# Patient Record
Sex: FEMALE | Race: WHITE | URBAN - METROPOLITAN AREA
[De-identification: names, ages, dates, MRNs, and addresses within clinical notes are randomized per-mention and may not be internally consistent; named-entity substitution may affect disease eponyms.]

---

## 2018-04-22 ENCOUNTER — OFFICE VISIT (OUTPATIENT)
Dept: URGENT CARE | Facility: URGENT CARE | Age: 23
End: 2018-04-22
Payer: COMMERCIAL

## 2018-04-22 VITALS
SYSTOLIC BLOOD PRESSURE: 108 MMHG | HEART RATE: 71 BPM | WEIGHT: 140.31 LBS | TEMPERATURE: 97.7 F | DIASTOLIC BLOOD PRESSURE: 61 MMHG

## 2018-04-22 DIAGNOSIS — R05.9 COUGH: ICD-10-CM

## 2018-04-22 DIAGNOSIS — R07.0 THROAT PAIN: Primary | ICD-10-CM

## 2018-04-22 DIAGNOSIS — R09.81 NASAL CONGESTION: ICD-10-CM

## 2018-04-22 LAB
DEPRECATED S PYO AG THROAT QL EIA: NORMAL
SPECIMEN SOURCE: NORMAL

## 2018-04-22 PROCEDURE — 87081 CULTURE SCREEN ONLY: CPT | Performed by: PHYSICIAN ASSISTANT

## 2018-04-22 PROCEDURE — 99203 OFFICE O/P NEW LOW 30 MIN: CPT | Performed by: PHYSICIAN ASSISTANT

## 2018-04-22 PROCEDURE — 87880 STREP A ASSAY W/OPTIC: CPT | Performed by: PHYSICIAN ASSISTANT

## 2018-04-22 RX ORDER — IBUPROFEN 600 MG/1
600 TABLET, FILM COATED ORAL EVERY 6 HOURS PRN
Qty: 30 TABLET | Refills: 1 | Status: SHIPPED | OUTPATIENT
Start: 2018-04-22

## 2018-04-22 RX ORDER — NORGESTIMATE AND ETHINYL ESTRADIOL 7DAYSX3 28
1 KIT ORAL DAILY
COMMUNITY

## 2018-04-22 RX ORDER — CETIRIZINE HYDROCHLORIDE, PSEUDOEPHEDRINE HYDROCHLORIDE 5; 120 MG/1; MG/1
1 TABLET, FILM COATED, EXTENDED RELEASE ORAL 2 TIMES DAILY
Qty: 28 TABLET | Refills: 0 | Status: SHIPPED | OUTPATIENT
Start: 2018-04-22

## 2018-04-22 NOTE — MR AVS SNAPSHOT
"              After Visit Summary   2018    Brionna Pelayo    MRN: [id number]           Patient Information     Date Of Birth          1995        Visit Information        Provider Department      2018 5:30 PM Lonnie Woods PA-C [facility]        Today's Diagnoses     Throat pain    -  1    Nasal congestion        Cough           Follow-ups after your visit        Who to contact     If you have questions or need follow up information about today's clinic visit or your schedule please contact [facility] directly at [phone].  Normal or non-critical lab and imaging results will be communicated to you by [employer], letter or phone within 4 business days after the clinic has received the results. If you do not hear from us within 7 days, please contact the clinic through [employer] or phone. If you have a critical or abnormal lab result, we will notify you by phone as soon as possible.  Submit refill requests through [employer] or call your pharmacy and they will forward the refill request to us. Please allow 3 business days for your refill to be completed.          Additional Information About Your Visit        MyChart Information     [employer] lets you send messages to your doctor, view your test results, renew your prescriptions, schedule appointments and more. To sign up, go to [email or web address] . Click on \"Log in\" on the left side of the screen, which will take you to the Welcome page. Then click on \"Sign up Now\" on the right side of the page.     You will be asked to enter the access code listed below, as well as some personal information. Please follow the directions to create your username and password.     Your access code is: [email or web address]  Expires: 2018 11:12 AM     Your access code will  in 90 days. If you need help or a new code, please call your [place] clinic or [phone].        Care EveryWhere ID     This is your " Care EveryWhere ID. This could be used by other organizations to access your [place] medical records  [id number]        Your Vitals Were     Pulse Temperature                71 97.7  F (36.5  C) (Oral)           Blood Pressure from Last 3 Encounters:   04/22/18 108/61    Weight from Last 3 Encounters:   04/22/18 140 lb 5 oz (63.6 kg)              We Performed the Following     Beta strep group A culture     Strep, Rapid Screen          Today's Medication Changes          These changes are accurate as of 4/22/18 11:59 PM.  If you have any questions, ask your nurse or doctor.               Start taking these medicines.        Dose/Directions    cetirizine-psuedoePHEDrine 5-120 MG per 12 hr tablet   Commonly known as:  zyrTEC-D   Used for:  Nasal congestion, Cough   Started by:  Lonnie Woods PA-C        Dose:  1 tablet   Take 1 tablet by mouth 2 times daily   Quantity:  28 tablet   Refills:  0       ibuprofen 600 MG tablet   Commonly known as:  ADVIL/MOTRIN   Used for:  Throat pain   Started by:  Lonnie Woods PA-C        Dose:  600 mg   Take 1 tablet (600 mg) by mouth every 6 hours as needed for moderate pain   Quantity:  30 tablet   Refills:  1            Where to get your medicines      Some of these will need a paper prescription and others can be bought over the counter.  Ask your nurse if you have questions.     Bring a paper prescription for each of these medications     cetirizine-psuedoePHEDrine 5-120 MG per 12 hr tablet    ibuprofen 600 MG tablet                Primary Care Provider Fax #    Physician No Ref-Primary [phone]       No address on file        Equal Access to Services     LLOYD CABELLO : Wendy florezo Soderrick, waaxda luqadaha, qaybta kaalmada shyam, antoine parrish . [facility] [phone].    ATENCIÓN: Si habla español, tiene a reynolds disposición servicios gratuitos de asistencia lingüística. Llame al [phone].    We comply with applicable federal  civil rights laws and [place] laws. We do not discriminate on the basis of race, color, national origin, age, disability, sex, sexual orientation, or gender identity.            Thank you!     Thank you for choosing [facility]  for your care. Our goal is always to provide you with excellent care. Hearing back from our patients is one way we can continue to improve our services. Please take a few minutes to complete the written survey that you may receive in the mail after your visit with us. Thank you!             Your Updated Medication List - Protect others around you: Learn how to safely use, store and throw away your medicines at www.disposemymeds.org.          This list is accurate as of 4/22/18 11:59 PM.  Always use your most recent med list.                   Brand Name Dispense Instructions for use Diagnosis    cetirizine-psuedoePHEDrine 5-120 MG per 12 hr tablet    zyrTEC-D    28 tablet    Take 1 tablet by mouth 2 times daily    Nasal congestion, Cough       DAYQUIL OR      Take by mouth as needed        FLUoxetine 20 MG capsule    PROzac     Take 20 mg by mouth daily        ibuprofen 600 MG tablet    ADVIL/MOTRIN    30 tablet    Take 1 tablet (600 mg) by mouth every 6 hours as needed for moderate pain    Throat pain       TRI-SPRINTEC 0.18/0.215/0.25 MG-35 MCG per tablet   Generic drug:  norgestim-eth estrad triphasic      Take 1 tablet by mouth daily

## 2018-04-23 LAB
BACTERIA SPEC CULT: NORMAL
SPECIMEN SOURCE: NORMAL

## 2018-04-23 NOTE — PROGRESS NOTES
SUBJECTIVE:   Brionna Pelayo is a 23 year old female presenting with a chief complaint of sore throat, nasal congestion, drainage.  Onset of symptoms was 3 day(s) ago.  Course of illness is same.    Severity moderate  Current and Associated symptoms: nasal drainage, sore throat  Treatment measures tried include theraflu.  Predisposing factors include none.    PMH  Depression     Allergies   Allergen Reactions     Walnuts [Nuts] Anaphylaxis and Swelling     Amoxicillin Hives         Social History   Substance Use Topics     Smoking status: Never Smoker     Smokeless tobacco: Never Used     Alcohol use Not on file       ROS:  CONSTITUTIONAL:NEGATIVE for fever, chills, change in weight  INTEGUMENTARY/SKIN: NEGATIVE for worrisome rashes, moles or lesions  EYES: NEGATIVE for vision changes or irritation  ENT/MOUTH: POSITIVE for nasal congestion, sore throat  RESP:POSITIVE for cough-non productive  CV: NEGATIVE for chest pain, palpitations or peripheral edema  GI: NEGATIVE for nausea, abdominal pain, heartburn, or change in bowel habits  MUSCULOSKELETAL: NEGATIVE for significant arthralgias or myalgia  NEURO: NEGATIVE for weakness, dizziness or paresthesias    OBJECTIVE  :/61  Pulse 71  Temp 97.7  F (36.5  C) (Oral)  Wt 140 lb 5 oz (63.6 kg)  GENERAL APPEARANCE: healthy, alert and no distress  EYES: EOMI,  PERRL, conjunctiva clear  HENT: TM's normal bilaterally and nasal turbinates erythematous, swollen  NECK: supple, nontender, no lymphadenopathy  RESP: lungs clear to auscultation - no rales, rhonchi or wheezes  CV: regular rates and rhythm, normal S1 S2, no murmur noted  ABDOMEN:  soft, nontender, no HSM or masses and bowel sounds normal  MS: extremities normal- no gross deformities noted, no erythema, FROM noted in all extremities  NEURO: Normal strength and tone, sensory exam grossly normal,  normal speech and mentation  SKIN: no suspicious lesions or rashes    Results for orders placed or performed in  visit on 04/22/18   Strep, Rapid Screen   Result Value Ref Range    Specimen Description Throat     Rapid Strep A Screen       NEGATIVE: No Group A streptococcal antigen detected by immunoassay, await culture report.       ASSESSMENT/PLAN:    ICD-10-CM    1. Throat pain R07.0 Strep, Rapid Screen     Beta strep group A culture     ibuprofen (ADVIL/MOTRIN) 600 MG tablet   2. Nasal congestion R09.81 cetirizine-psuedoePHEDrine (ZYRTEC-D) 5-120 MG per 12 hr tablet   3. Cough R05 cetirizine-psuedoePHEDrine (ZYRTEC-D) 5-120 MG per 12 hr tablet         cetirizine-psuedoePHEDrine (ZYRTEC-D) 5-120 MG per 12 hr tablet     ibuprofen (ADVIL/MOTRIN) 600 MG tablet       Fluids, rest  strep culture pending  Follow up with PCP as needed  See orders in Epic